# Patient Record
Sex: FEMALE | Race: WHITE | URBAN - METROPOLITAN AREA
[De-identification: names, ages, dates, MRNs, and addresses within clinical notes are randomized per-mention and may not be internally consistent; named-entity substitution may affect disease eponyms.]

---

## 2023-06-08 ENCOUNTER — OFFICE VISIT (OUTPATIENT)
Dept: URBAN - METROPOLITAN AREA CLINIC 6 | Facility: CLINIC | Age: 68
End: 2023-06-08
Payer: MEDICARE

## 2023-06-08 DIAGNOSIS — H43.813 VITREOUS DEGENERATION, BILATERAL: ICD-10-CM

## 2023-06-08 DIAGNOSIS — D48.5 NEOPLASM OF UNCERTIAN BEHAVIOR OF SKIN: ICD-10-CM

## 2023-06-08 DIAGNOSIS — H25.813 COMBINED FORMS OF AGE-RELATED CATARACT, BILATERAL: Primary | ICD-10-CM

## 2023-06-08 PROCEDURE — 92004 COMPRE OPH EXAM NEW PT 1/>: CPT | Performed by: OPTOMETRIST

## 2023-06-08 ASSESSMENT — INTRAOCULAR PRESSURE
OD: 14
OS: 17

## 2023-06-08 NOTE — IMPRESSION/PLAN
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 Plan: You are being referred to  Dr. Faith Roe or skin lesion RLL of adnexa - minimal elevation but enlarging in size over time. No surrounding vascularization, however, pt requesting removal. 
		 Office Phone Number: [phone] Address: [address], [address] [place] Please contact the office listed above to arrange an appointment. NOTE:  Some insurance providers require a REFERRAL FROM YOUR PRIMARY CARE DOCTOR. Please contact your insurance company in order to see if this is the policy. If so, contact your primary care doctor in order to obtain authorization.